# Patient Record
Sex: FEMALE | Race: WHITE | Employment: FULL TIME | URBAN - METROPOLITAN AREA
[De-identification: names, ages, dates, MRNs, and addresses within clinical notes are randomized per-mention and may not be internally consistent; named-entity substitution may affect disease eponyms.]

---

## 2017-03-04 ENCOUNTER — HOSPITAL ENCOUNTER (EMERGENCY)
Facility: CLINIC | Age: 57
Discharge: HOME | End: 2017-03-04
Attending: FAMILY MEDICINE | Admitting: FAMILY MEDICINE
Payer: COMMERCIAL

## 2017-03-04 VITALS
RESPIRATION RATE: 20 BRPM | DIASTOLIC BLOOD PRESSURE: 85 MMHG | BODY MASS INDEX: 31.07 KG/M2 | HEIGHT: 68 IN | WEIGHT: 205 LBS | HEART RATE: 85 BPM | OXYGEN SATURATION: 100 % | SYSTOLIC BLOOD PRESSURE: 134 MMHG | TEMPERATURE: 98.7 F

## 2017-03-04 DIAGNOSIS — S61.209A AVULSION OF SKIN OF FINGER, INITIAL ENCOUNTER: ICD-10-CM

## 2017-03-04 PROCEDURE — 25000125 ZZHC RX 250: Performed by: FAMILY MEDICINE

## 2017-03-04 PROCEDURE — 90471 IMMUNIZATION ADMIN: CPT

## 2017-03-04 PROCEDURE — 99282 EMERGENCY DEPT VISIT SF MDM: CPT | Mod: 25

## 2017-03-04 PROCEDURE — 99282 EMERGENCY DEPT VISIT SF MDM: CPT | Performed by: FAMILY MEDICINE

## 2017-03-04 PROCEDURE — 90715 TDAP VACCINE 7 YRS/> IM: CPT | Performed by: FAMILY MEDICINE

## 2017-03-04 RX ADMIN — CLOSTRIDIUM TETANI TOXOID ANTIGEN (FORMALDEHYDE INACTIVATED), CORYNEBACTERIUM DIPHTHERIAE TOXOID ANTIGEN (FORMALDEHYDE INACTIVATED), BORDETELLA PERTUSSIS TOXOID ANTIGEN (GLUTARALDEHYDE INACTIVATED), BORDETELLA PERTUSSIS FILAMENTOUS HEMAGGLUTININ ANTIGEN (FORMALDEHYDE INACTIVATED), BORDETELLA PERTUSSIS PERTACTIN ANTIGEN, AND BORDETELLA PERTUSSIS FIMBRIAE 2/3 ANTIGEN 0.5 ML: 5; 2; 2.5; 5; 3; 5 INJECTION, SUSPENSION INTRAMUSCULAR at 14:35

## 2017-03-04 ASSESSMENT — ENCOUNTER SYMPTOMS: WOUND: 1

## 2017-03-04 NOTE — ED AVS SNAPSHOT
[facility] Emergency Department    [address] [place] [address]    Phone:  [phone]    Fax:  [phone]                                       Alida Lane   MRN: [id number]    Department:  [facility] Emergency Department   Date of Visit:  3/4/2017           After Visit Summary Signature Page     I have received my discharge instructions, and my questions have been answered. I have discussed any challenges I see with this plan with the nurse or doctor.    ..........................................................................................................................................  Patient/Patient Representative Signature      ..........................................................................................................................................  Patient Representative Print Name and Relationship to Patient    ..................................................               ................................................  Date                                            Time    ..........................................................................................................................................  Reviewed by Signature/Title    ...................................................              ..............................................  Date                                                            Time

## 2017-03-04 NOTE — DISCHARGE INSTRUCTIONS
Skin Tear (Skin Avulsion)  A skin avulsion is a tearing of the top layer of skin. This commonly happens after a fall or other injury. It also tends to be more common in older people, or those taking blood thinners or steroids for long periods of time.  Home care  The following guidelines will help you care for your wound at home:    Keep the wound clean and dry for the first 48 hours after the stitches have been placed.    If there is a dressing or bandage, change it when it gets wet or dirty. Otherwise, leave it on for the first 24 hours, then change it once a day or as often as the doctor says.    If stitches or staples were used, check the wound every day.    After taking off the dressing, wash the area gently with soap and water. Clean as close to the stitches as you can. Avoid washing or rubbing the stitches directly.    After 3 days you can keep the bandages off the wound, unless told otherwise. Allow the wound to be open to the air.    Keep a thin layer of antibiotic ointment on the cut. This will keep the wound clean, make it easier to remove the stitches, and reduce scarring.    If your wound is oozing, you can put a nonstick dressing over it. Then, reapply the bandage or dressing as you were told.    You can shower as usual after the first 24 hours, but don't soak the area in water (no baths or swimming) until the stitches or staples are taken out.    If surgical tape was used, keep the area clean and dry. If it becomes wet, blot it dry with a towel.    If skin glue was used, don't put any creams, lotions, or antibiotic ointments on it. These can dissolve the glue. Usually the glue will flake off in about 5 to 10 days by itself. Try to resist picking it off before that so the wound doesn't open up. When it gets wet, dry it gently.  Here is some information about medications:    You may use acetaminophen or ibuprofen to control pain, unless another pain medicine was given. If you have chronic liver or  kidney disease or ever had a stomach ulcer or GI bleeding, talk with your doctor before using these medicines.    If you were given antibiotics, take them until they are all used up. It is important to finish the antibiotics even if the wound looks better. This will ensure that the infection has cleared.  Follow-up care  Follow up with your doctor, clinic, or this facility as you were advised, and:    Watch for any signs of infection, such as increasing redness, swelling, or pus coming out. If this happens, don't wait for your scheduled visit. Instead, see a doctor sooner.    Stitches or staples are usually taken out within 5 to 14 days. This varies depending on what part of your body they are on, and the type of wound. The doctor will tell you how long stitches should be left in.     If surgical tape was used, it is usually left on for 7 to 10 days. You can remove surgical tape after that unless you were told otherwise. If you try to remove it, and it is too hard, soaking can help. If the edges of the cut pull apart, stop removing the tape and follow up with your doctor    As mentioned above, skin glue will flake off by itself in 5 to 10 days, so you don't need to pull it off.  Note: If any X-rays were done, a radiologist will review them. You will be notified of any changes that may affect your care.  When to seek medical care  Get prompt medical attention if any of the following occur:    Increasing pain in the wound    Redness, swelling, or pus coming from the wound    Fever of 100.4 F (38 C) or higher, or as directed by your health care provider    Sutures or staples come apart or fall out before your next appointment    Surgical tape closures fall off within 7 days, or the wound edges re-open    Bleeding not controlled by direct pressure    8727-9437 [employer]. [address] [place] [address], [place] [address]. All rights reserved. This information is not intended as a substitute for professional  medical care. Always follow your healthcare professional's instructions.

## 2017-03-04 NOTE — ED AVS SNAPSHOT
[facility] Emergency Department    [address] [place] [address]    Phone:  [phone]    Fax:  [phone]                                       Alida Lane   MRN: [id number]    Department:  [facility] Emergency Department   Date of Visit:  3/4/2017           Patient Information     Date Of Birth          1960        Your diagnoses for this visit were:     Avulsion of skin of finger, initial encounter        You were seen by Yordy Garnica MD.      Follow-up Information     Follow up with Luisa Lagos. Schedule an appointment as soon as possible for a visit in 4 days.    Specialty:  Physician Assistant    Why:  If not improving.    Contact information:    [address] [place]  [facility] [address]  [phone]          Discharge Instructions         Skin Tear (Skin Avulsion)  A skin avulsion is a tearing of the top layer of skin. This commonly happens after a fall or other injury. It also tends to be more common in older people, or those taking blood thinners or steroids for long periods of time.  Home care  The following guidelines will help you care for your wound at home:    Keep the wound clean and dry for the first 48 hours after the stitches have been placed.    If there is a dressing or bandage, change it when it gets wet or dirty. Otherwise, leave it on for the first 24 hours, then change it once a day or as often as the doctor says.    If stitches or staples were used, check the wound every day.    After taking off the dressing, wash the area gently with soap and water. Clean as close to the stitches as you can. Avoid washing or rubbing the stitches directly.    After 3 days you can keep the bandages off the wound, unless told otherwise. Allow the wound to be open to the air.    Keep a thin layer of antibiotic ointment on the cut. This will keep the wound clean, make it easier to remove the stitches, and reduce  scarring.    If your wound is oozing, you can put a nonstick dressing over it. Then, reapply the bandage or dressing as you were told.    You can shower as usual after the first 24 hours, but don't soak the area in water (no baths or swimming) until the stitches or staples are taken out.    If surgical tape was used, keep the area clean and dry. If it becomes wet, blot it dry with a towel.    If skin glue was used, don't put any creams, lotions, or antibiotic ointments on it. These can dissolve the glue. Usually the glue will flake off in about 5 to 10 days by itself. Try to resist picking it off before that so the wound doesn't open up. When it gets wet, dry it gently.  Here is some information about medications:    You may use acetaminophen or ibuprofen to control pain, unless another pain medicine was given. If you have chronic liver or kidney disease or ever had a stomach ulcer or GI bleeding, talk with your doctor before using these medicines.    If you were given antibiotics, take them until they are all used up. It is important to finish the antibiotics even if the wound looks better. This will ensure that the infection has cleared.  Follow-up care  Follow up with your doctor, clinic, or this facility as you were advised, and:    Watch for any signs of infection, such as increasing redness, swelling, or pus coming out. If this happens, don't wait for your scheduled visit. Instead, see a doctor sooner.    Stitches or staples are usually taken out within 5 to 14 days. This varies depending on what part of your body they are on, and the type of wound. The doctor will tell you how long stitches should be left in.     If surgical tape was used, it is usually left on for 7 to 10 days. You can remove surgical tape after that unless you were told otherwise. If you try to remove it, and it is too hard, soaking can help. If the edges of the cut pull apart, stop removing the tape and follow up with your doctor    As  mentioned above, skin glue will flake off by itself in 5 to 10 days, so you don't need to pull it off.  Note: If any X-rays were done, a radiologist will review them. You will be notified of any changes that may affect your care.  When to seek medical care  Get prompt medical attention if any of the following occur:    Increasing pain in the wound    Redness, swelling, or pus coming from the wound    Fever of 100.4 F (38 C) or higher, or as directed by your health care provider    Sutures or staples come apart or fall out before your next appointment    Surgical tape closures fall off within 7 days, or the wound edges re-open    Bleeding not controlled by direct pressure    3343-4159 [employer]. [address] [place] [address], [place] [address]. All rights reserved. This information is not intended as a substitute for professional medical care. Always follow your healthcare professional's instructions.          24 Hour Appointment Hotline       To make an appointment at any [facility], call [phone] ([phone]). If you don't have a family doctor or clinic, we will help you find one. [facility] are conveniently located to serve the needs of you and your family.             Review of your medicines      Our records show that you are taking the medicines listed below. If these are incorrect, please call your family doctor or clinic.        Dose / Directions Last dose taken    AMITRIPTYLINE HCL PO        Take by mouth nightly as needed for sleep   Refills:  0        PREVACID PO   Dose:  30 mg        Take 30 mg by mouth 2 times daily   Refills:  0        PROZAC PO   Dose:  20 mg        Take 20 mg by mouth daily   Refills:  0        SYNTHROID PO   Dose:  125 mcg        Take 125 mcg by mouth daily   Refills:  0                Procedures and tests performed during your visit     Wound care      Orders Needing Specimen Collection     None      Pending Results     No orders found from 3/2/2017 to  "3/5/2017.            Pending Culture Results     No orders found from 3/2/2017 to 3/5/2017.            Thank you for choosing [facility]       Thank you for choosing [facility] for your care. Our goal is always to provide you with excellent care. Hearing back from our patients is one way we can continue to improve our services. Please take a few minutes to complete the written survey that you may receive in the mail after you visit with us. Thank you!        [employer] Information     [facility] lets you send messages to your doctor, view your test results, renew your prescriptions, schedule appointments and more. To sign up, go to [email or web address] . Click on \"Log in\" on the left side of the screen, which will take you to the Welcome page. Then click on \"Sign up Now\" on the right side of the page.     You will be asked to enter the access code listed below, as well as some personal information. Please follow the directions to create your username and password.     Your access code is: [email or web address]  Expires: 2017  2:30 PM     Your access code will  in 90 days. If you need help or a new code, please call your [facility] clinic or [phone].        Care EveryWhere ID     This is your Care EveryWhere ID. This could be used by other organizations to access your [facility] medical records  [id number]        After Visit Summary       This is your record. Keep this with you and show to your community pharmacist(s) and doctor(s) at your next visit.                  "

## 2017-03-04 NOTE — ED PROVIDER NOTES
History     Chief Complaint   Patient presents with     Hand Injury     The history is provided by the patient.     Alida Lane is a 57 year old female who reports to the ED for a hand injury. Patient cut the tip of her left index finger with a rotary tool for sewing. Bleeding is controlled. Patient reports no other injuries.  The patient tried holding direct pressure at home and this did not help.    I have reviewed the Medications, Allergies, Past Medical and Surgical History, and Social History in the Epic system.    Patient Active Problem List   Diagnosis   (none) - all problems resolved or deleted     History reviewed. No pertinent past medical history.    Past Surgical History   Procedure Laterality Date     Skin grafts       Gyn surgery       2 c-sections     Hysterectomy, elena       hysterectomy     Orthopedic surgery       left knee replacement x2     Laminectomy cerivcal posterior one level       Release carpal tunnel bilateral       Release ulnar nerve (elbow)         No family history on file.    Social History   Substance Use Topics     Smoking status: Never Smoker     Smokeless tobacco: Not on file     Alcohol use Yes      Comment: 2-3 x week          There is no immunization history on file for this patient.       Allergies   Allergen Reactions     Compazine [Prochlorperazine]      Dystonia     Latex      Itching, shortness of breath       Current Outpatient Prescriptions   Medication Sig Dispense Refill     Levothyroxine Sodium (SYNTHROID PO) Take 125 mcg by mouth daily       Lansoprazole (PREVACID PO) Take 30 mg by mouth 2 times daily       AMITRIPTYLINE HCL PO Take by mouth nightly as needed for sleep       FLUoxetine HCl (PROZAC PO) Take 20 mg by mouth daily           Review of Systems   Skin: Positive for wound (left index finger cut).   All other systems reviewed and are negative.      Physical Exam   BP: 134/85  Pulse: 85  Temp: 98.7  F (37.1  C)  Resp: 20  Height: 172.7 cm (5'  "8\")  Weight: 93 kg (205 lb)  SpO2: 100 %  Physical Exam   Constitutional: She appears well-developed and well-nourished. No distress.   Neck: Normal range of motion.   Musculoskeletal: Normal range of motion.   Neurological: She is alert.   Skin: Skin is warm and dry. Laceration noted. No bruising, no burn, no ecchymosis and no rash noted.   2 cm avulsion to the tip of left index finger.  No active bleeding at moment.   Psychiatric: She has a normal mood and affect. Her behavior is normal.       ED Course     ED Course     Procedures         finger was not able to be repaired.  The bleeding stopped pretty much this point.  I did apply some quick clot and put a pressure dressing on this and this will need to heal by secondary intent.    Assessments & Plan (with Medical Decision Making)  avulsion of the skin of the finger      I have reviewed the nursing notes.    I have reviewed the findings, diagnosis, plan and need for follow up with the patient.    Discharge Medication List as of 3/4/2017  2:41 PM          Final diagnoses:   Avulsion of skin of finger, initial encounter     This document serves as a record of services personally performed by Yordy Garnica MD. It was created on their behalf by Rosy Tyler, a trained medical scribe. The creation of this record is based on the provider's personal observations and the statements of the patient. This document has been checked and approved by the attending provider.  Note: Chart documentation done in part with Dragon Voice Recognition software. Although reviewed after completion, some word and grammatical errors may remain.      3/4/2017   [facility] EMERGENCY DEPARTMENT     Yordy Garnica MD  03/08/17 1722       Yordy Garnica MD  03/08/17 1722    "